# Patient Record
(demographics unavailable — no encounter records)

---

## 2025-03-18 NOTE — HISTORY OF PRESENT ILLNESS
[FreeTextEntry1] : 50 yo  with LMP 3/2021 presents today for annual gyn visit.  No PMB nor staining.  Some frequency and occ loss of urine.  C/o mood changes and decreased libido   OB: 2006 F 6.8#  Dr Mackenzie [facility] 2010 F 6.4#  [place]  M 6.9#  [place]  GYN: 2020 COVID. 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

## 2025-03-18 NOTE — DISCUSSION/SUMMARY
[FreeTextEntry1] : Health Maintenance: -Pap with HPV neg '24, repeat next year. -Mammo Rx -TBSE -colonoscopy guidelines reviewed with patient BDS Rx provdided -Achieve Vit D levels of 30-40, intake of 1100 mg daily calcium mostly thru dark green leafy greens and milk products, exercise 30 minutes TIW  Menopause: LMP 2021 Pt consulted for HRT but declined Would consider FDA approved estradiol 0.05 with prometrium 100mg daily and could titrate upward if neeced for treatment of osteopenia/porosis

## 2025-03-18 NOTE — HISTORY OF PRESENT ILLNESS
[FreeTextEntry1] : 48 yo  with LMP 3/2021 presents today for annual gyn visit.  No PMB nor staining.  Some frequency and occ loss of urine.  C/o mood changes and decreased libido   OB: 2006 F 6.8#  Dr Mackenzie [facility] 2010 F 6.4#  [place]  M 6.9#  [place]  GYN: 2020 COVID. 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

## 2025-03-18 NOTE — PHYSICAL EXAM
[Appropriately responsive] : appropriately responsive [Alert] : alert [No Acute Distress] : no acute distress [No Lymphadenopathy] : no lymphadenopathy [Soft] : soft [Non-tender] : non-tender [Non-distended] : non-distended [No HSM] : No HSM [No Lesions] : no lesions [No Mass] : no mass [Oriented x3] : oriented x3 [Examination Of The Breasts] : a normal appearance [Normal] : normal [No Masses] : no breast masses were palpable [FreeTextEntry9] : Guicac  positive (recent irritation to anus)

## 2025-06-20 NOTE — HISTORY OF PRESENT ILLNESS
[FreeTextEntry1] : 51 yo  with LMP 3/2021 presents to review BDS Pt interested in HRT Menopause was age 46. She has stopped having hot flashes. She is worried about her rising lipids and maintaining bone density.  Low hormones noted 3/24 No PMB nor staining.  Some frequency and occ loss of urine.  C/o mood changes and decreased libido   OB:  F 6.8#  Dr Mackenzie [facility]  F 6.4#  [place]  M 6.9#  [place]  GYN: 2020 COVID. 3/21 following second vaccine, amenorrhea for 6 months 10/27/21 US pelvis:  endometrial biopsy - pieces of polyp 2022- Breast MRI ( TC score 26.4%)- left breast bx benign, pt did not have f/u 6month imaging nor did she have mammo/sono   PMHx: COVID early  followed by COVID vaccination and amenorrhea x 6months Subsequently had Lyme's followed by Bell's Palsy   FH: Mother breast cancer 50s, no RT nor chemo. 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

## 2025-06-20 NOTE — PLAN
[FreeTextEntry1] : HRT r/b/a d/w pt Reviewed [facility] Discussed transdermal estrogen Pt's mother recovered well from breast cancer. Her Mammo is normal with US. MRI to be done in 6 months  RTO 6 months Rx for breast MRI at that time. Pt plans to start HRT in July.